# Patient Record
Sex: MALE | Race: WHITE | NOT HISPANIC OR LATINO | Employment: FULL TIME | URBAN - METROPOLITAN AREA
[De-identification: names, ages, dates, MRNs, and addresses within clinical notes are randomized per-mention and may not be internally consistent; named-entity substitution may affect disease eponyms.]

---

## 2019-10-21 ENCOUNTER — HISTORICAL (OUTPATIENT)
Dept: ADMINISTRATIVE | Facility: HOSPITAL | Age: 29
End: 2019-10-21

## 2019-10-21 LAB
ABS NEUT (OLG): 4.2 X10(3)/MCL (ref 2.1–9.2)
ALBUMIN SERPL-MCNC: 4.6 GM/DL (ref 3.4–5)
ALBUMIN/GLOB SERPL: 1.7 {RATIO} (ref 1.5–2.5)
ALP SERPL-CCNC: 44 UNIT/L (ref 38–126)
ALT SERPL-CCNC: 8 UNIT/L (ref 7–52)
AST SERPL-CCNC: 13 UNIT/L (ref 15–37)
BILIRUB SERPL-MCNC: 0.7 MG/DL (ref 0.2–1)
BILIRUBIN DIRECT+TOT PNL SERPL-MCNC: 0.1 MG/DL (ref 0–0.5)
BILIRUBIN DIRECT+TOT PNL SERPL-MCNC: 0.6 MG/DL
BUN SERPL-MCNC: 12 MG/DL (ref 7–18)
CALCIUM SERPL-MCNC: 9.8 MG/DL (ref 8.5–10)
CHLORIDE SERPL-SCNC: 102 MMOL/L (ref 98–107)
CHOLEST SERPL-MCNC: 211 MG/DL (ref 0–200)
CHOLEST/HDLC SERPL: 4.7 {RATIO}
CO2 SERPL-SCNC: 25 MMOL/L (ref 21–32)
CREAT SERPL-MCNC: 0.89 MG/DL (ref 0.6–1.3)
ERYTHROCYTE [DISTWIDTH] IN BLOOD BY AUTOMATED COUNT: 12.8 % (ref 11.5–17)
GLOBULIN SER-MCNC: 2.7 GM/DL (ref 1.2–3)
GLUCOSE SERPL-MCNC: 111 MG/DL (ref 74–106)
HCT VFR BLD AUTO: 43.7 % (ref 42–52)
HDLC SERPL-MCNC: 45 MG/DL (ref 35–60)
HGB BLD-MCNC: 15 GM/DL (ref 14–18)
LDLC SERPL CALC-MCNC: 146 MG/DL (ref 0–129)
LYMPHOCYTES # BLD AUTO: 2.8 X10(3)/MCL (ref 0.6–3.4)
LYMPHOCYTES NFR BLD AUTO: 36.4 % (ref 13–40)
MCH RBC QN AUTO: 29.7 PG (ref 27–31.2)
MCHC RBC AUTO-ENTMCNC: 34 GM/DL (ref 32–36)
MCV RBC AUTO: 86 FL (ref 80–94)
MONOCYTES # BLD AUTO: 0.6 X10(3)/MCL (ref 0.1–1.3)
MONOCYTES NFR BLD AUTO: 8.5 % (ref 0.1–24)
NEUTROPHILS NFR BLD AUTO: 55.1 % (ref 47–80)
PLATELET # BLD AUTO: 305 X10(3)/MCL (ref 130–400)
PMV BLD AUTO: 8.7 FL (ref 9.4–12.4)
POTASSIUM SERPL-SCNC: 4.1 MMOL/L (ref 3.5–5.1)
PROT SERPL-MCNC: 7.3 GM/DL (ref 6.4–8.2)
RBC # BLD AUTO: 5.05 X10(6)/MCL (ref 4.7–6.1)
SODIUM SERPL-SCNC: 138 MMOL/L (ref 136–145)
TESTOST SERPL-MCNC: 344 NG/DL (ref 300–1060)
TRIGL SERPL-MCNC: 98 MG/DL (ref 30–150)
TSH SERPL-ACNC: 1.32 MIU/ML (ref 0.35–4.94)
VLDLC SERPL CALC-MCNC: 19.6 MG/DL
WBC # SPEC AUTO: 7.6 X10(3)/MCL (ref 4.5–11.5)

## 2020-10-26 ENCOUNTER — HISTORICAL (OUTPATIENT)
Dept: ADMINISTRATIVE | Facility: HOSPITAL | Age: 30
End: 2020-10-26

## 2020-10-26 LAB
ABS NEUT (OLG): 5.9 X10(3)/MCL (ref 2.1–9.2)
ALBUMIN SERPL-MCNC: 4.2 GM/DL (ref 3.4–5)
ALBUMIN/GLOB SERPL: 1.75 {RATIO} (ref 1.5–2.5)
ALP SERPL-CCNC: 35 UNIT/L (ref 38–126)
ALT SERPL-CCNC: 22 UNIT/L (ref 7–52)
AST SERPL-CCNC: 12 UNIT/L (ref 15–37)
BILIRUB SERPL-MCNC: 0.5 MG/DL (ref 0.2–1)
BILIRUBIN DIRECT+TOT PNL SERPL-MCNC: 0.1 MG/DL (ref 0–0.5)
BILIRUBIN DIRECT+TOT PNL SERPL-MCNC: 0.4 MG/DL
BUN SERPL-MCNC: 20 MG/DL (ref 7–18)
CALCIUM SERPL-MCNC: 9.6 MG/DL (ref 8.5–10.1)
CHLORIDE SERPL-SCNC: 103 MMOL/L (ref 98–107)
CHOLEST SERPL-MCNC: 215 MG/DL (ref 0–200)
CHOLEST/HDLC SERPL: 3.4 {RATIO}
CO2 SERPL-SCNC: 26 MMOL/L (ref 21–32)
CREAT SERPL-MCNC: 0.84 MG/DL (ref 0.6–1.3)
ERYTHROCYTE [DISTWIDTH] IN BLOOD BY AUTOMATED COUNT: 13.2 % (ref 11.5–17)
GLOBULIN SER-MCNC: 2.4 GM/DL (ref 1.2–3)
GLUCOSE SERPL-MCNC: 103 MG/DL (ref 74–106)
HCT VFR BLD AUTO: 44 % (ref 42–52)
HDLC SERPL-MCNC: 64 MG/DL (ref 35–60)
HGB BLD-MCNC: 14.7 GM/DL (ref 14–18)
LDLC SERPL CALC-MCNC: 146 MG/DL (ref 0–129)
LYMPHOCYTES # BLD AUTO: 4.2 X10(3)/MCL (ref 0.6–3.4)
LYMPHOCYTES NFR BLD AUTO: 36.9 % (ref 13–40)
MCH RBC QN AUTO: 30.4 PG (ref 27–31.2)
MCHC RBC AUTO-ENTMCNC: 33 GM/DL (ref 32–36)
MCV RBC AUTO: 91 FL (ref 80–94)
MONOCYTES # BLD AUTO: 1.2 X10(3)/MCL (ref 0.1–1.3)
MONOCYTES NFR BLD AUTO: 10.6 % (ref 0.1–24)
NEUTROPHILS NFR BLD AUTO: 52.5 % (ref 47–80)
PLATELET # BLD AUTO: 390 X10(3)/MCL (ref 130–400)
PMV BLD AUTO: 8.8 FL (ref 9.4–12.4)
POTASSIUM SERPL-SCNC: 4.2 MMOL/L (ref 3.5–5.1)
PROT SERPL-MCNC: 6.6 GM/DL (ref 6.4–8.2)
RBC # BLD AUTO: 4.84 X10(6)/MCL (ref 4.7–6.1)
SODIUM SERPL-SCNC: 139 MMOL/L (ref 136–145)
TESTOST SERPL-MCNC: 329 NG/DL (ref 300–1060)
TRIGL SERPL-MCNC: 62 MG/DL (ref 30–150)
TSH SERPL-ACNC: 0.63 MIU/ML (ref 0.35–4.94)
VLDLC SERPL CALC-MCNC: 12.4 MG/DL
WBC # SPEC AUTO: 11.3 X10(3)/MCL (ref 4.5–11.5)

## 2021-08-25 ENCOUNTER — HISTORICAL (OUTPATIENT)
Dept: ADMINISTRATIVE | Facility: HOSPITAL | Age: 31
End: 2021-08-25

## 2021-08-25 LAB
ABS NEUT (OLG): 4.4 X10(3)/MCL (ref 2.1–9.2)
ALBUMIN SERPL-MCNC: 4.9 GM/DL (ref 3.4–5)
ALBUMIN/GLOB SERPL: 1.96 {RATIO} (ref 1.5–2.5)
ALP SERPL-CCNC: 50 UNIT/L (ref 38–126)
ALT SERPL-CCNC: 15 UNIT/L (ref 7–52)
AST SERPL-CCNC: 15 UNIT/L (ref 15–37)
BILIRUB SERPL-MCNC: 0.7 MG/DL (ref 0.2–1)
BILIRUBIN DIRECT+TOT PNL SERPL-MCNC: 0.2 MG/DL (ref 0–0.5)
BILIRUBIN DIRECT+TOT PNL SERPL-MCNC: 0.5 MG/DL
BUN SERPL-MCNC: 12 MG/DL (ref 7–18)
CALCIUM SERPL-MCNC: 10.3 MG/DL (ref 8.5–10.1)
CHLORIDE SERPL-SCNC: 101 MMOL/L (ref 98–107)
CHOLEST SERPL-MCNC: 204 MG/DL (ref 0–200)
CHOLEST/HDLC SERPL: 4.9 {RATIO}
CO2 SERPL-SCNC: 26 MMOL/L (ref 21–32)
CREAT SERPL-MCNC: 0.83 MG/DL (ref 0.6–1.3)
ERYTHROCYTE [DISTWIDTH] IN BLOOD BY AUTOMATED COUNT: 12.6 % (ref 11.5–17)
GLOBULIN SER-MCNC: 2.5 GM/DL (ref 1.2–3)
GLUCOSE SERPL-MCNC: 99 MG/DL (ref 74–106)
HCT VFR BLD AUTO: 46.5 % (ref 42–52)
HDLC SERPL-MCNC: 42 MG/DL (ref 35–60)
HGB BLD-MCNC: 15.2 GM/DL (ref 14–18)
LDLC SERPL CALC-MCNC: 128 MG/DL (ref 0–129)
LYMPHOCYTES # BLD AUTO: 2.8 X10(3)/MCL (ref 0.6–3.4)
LYMPHOCYTES NFR BLD AUTO: 35.8 % (ref 13–40)
MCH RBC QN AUTO: 28.8 PG (ref 27–31.2)
MCHC RBC AUTO-ENTMCNC: 33 GM/DL (ref 32–36)
MCV RBC AUTO: 88 FL (ref 80–94)
MONOCYTES # BLD AUTO: 0.6 X10(3)/MCL (ref 0.1–1.3)
MONOCYTES NFR BLD AUTO: 7.7 % (ref 0.1–24)
NEUTROPHILS NFR BLD AUTO: 56.5 % (ref 47–80)
PLATELET # BLD AUTO: 351 X10(3)/MCL (ref 130–400)
PMV BLD AUTO: 8.7 FL (ref 9.4–12.4)
POTASSIUM SERPL-SCNC: 4.2 MMOL/L (ref 3.5–5.1)
PROT SERPL-MCNC: 7.4 GM/DL (ref 6.4–8.2)
RBC # BLD AUTO: 5.27 X10(6)/MCL (ref 4.7–6.1)
SODIUM SERPL-SCNC: 138 MMOL/L (ref 136–145)
TRIGL SERPL-MCNC: 134 MG/DL (ref 30–150)
TSH SERPL-ACNC: 1.22 MIU/ML (ref 0.35–4.94)
VLDLC SERPL CALC-MCNC: 26.8 MG/DL
WBC # SPEC AUTO: 7.8 X10(3)/MCL (ref 4.5–11.5)

## 2021-08-26 LAB — EST CREAT CLEARANCE SER (OHS): 178 ML/MIN

## 2022-04-10 ENCOUNTER — HISTORICAL (OUTPATIENT)
Dept: ADMINISTRATIVE | Facility: HOSPITAL | Age: 32
End: 2022-04-10

## 2022-04-28 VITALS
OXYGEN SATURATION: 99 % | BODY MASS INDEX: 29.85 KG/M2 | WEIGHT: 213.19 LBS | HEIGHT: 71 IN | SYSTOLIC BLOOD PRESSURE: 123 MMHG | DIASTOLIC BLOOD PRESSURE: 87 MMHG

## 2022-05-03 NOTE — HISTORICAL [facility]
This is a historical note converted from Ava. Formatting and pictures may have been removed.  Please reference Ava for original formatting and attached multimedia. Chief Complaint  CPX FASTING  History of Present Illness  30?year old male presents for wellness visit.  Blood Pressure - 118/88  BMI - 32.59  Immunizations - Influenza vaccine and Tdap due  Diet - Average  Exercise - Intermittent yard work  Continues to struggle with fatigue,?mood irritability and lack of motivation.??Continues to gradually worsen. ?Affecting?daily activities?along with communications with kids and wife   10/21/19  Review of Systems  Constitutional:?No weight loss, no fever, fatigue, no chills, no night sweats,?no weakness  Eyes:?No blurred vision,?no redness,?no drainage,?no ocular?pain  HEENT:?No sore throat,?no ear pain, no sinus pressure, no nasal congestion, no rhinorrhea, no postnasal drip  Respiratory:?No cough, no wheezing, no sputum production, no shortness of breath  Cardiovascular:?No chest pain, no palpitations, no dyspnea on exertion,?no orthopnea  Gastrointestinal:?No nausea, no vomiting, no abdominal pain, no diarrhea,?no constipation, no melena,?no hematochezia  Genitourinary:?No dysuria, no hematuria, no frequency, no urgency, no incontinence, no discharge  Musculoskeletal:?No myalgias, no arthralgias, no weakness, no joint effusion, no edema  Integumentary:?No rashes, no hives, no itching, no lesions, no jaundice  Neurologic:?No headaches, no numbness, no tingling, no weakness, no dizziness  Psychiatric:?anxiety, irritability, no depression,?no suicidal ideations, no?homicidal ideations,?no delusions, no hallucinations  Endocrine:?No polyuria, no polydipsia, no polyphagia  Hematology:?No bruising, no lymphadenopathy,?no paleness  ?  Physical Exam  Vitals & Measurements  HR:?60(Peripheral)? BP:?118/88?  HT:?180.00?cm? WT:?105.600?kg? BMI:?32.59?  General:?Well developed, Well-nourished, in No acute distress,  A&O x 4  Eye:?PERRLA, EOMI, Clear conjunctiva, Eyelids unremarkable  Ears:?Bilateral EAC clear?and?Bilateral TM clear  Nose:? Mucosa normal, No rhinorrhea, No lesions  O/P:??No?erythema,?No tonsillar hypertrophy,?No tonsillar exudates,?No cobblestoning  Neck:?Soft, Nontender, No thyromegaly, Full range of motion, No cervical lymphadenopathy, No JVD  Cardiovascular:?Regular rate and rhythm, No murmurs, No gallops, No rubs  Respiratory:?Clear to auscultation bilaterally, No wheezes, No rhonchi, No?crackles  Abdomen:? Soft, Nontender, No hepatosplenomegaly, Normal and equal bowel sounds, No masses, No rebound, No guarding  Musculoskeletal:? No tenderness, FROM, No joint abnormality, No clubbing, No cyanosis,No?edema  Neurologic:? No motor or sensory deficits, Reflexes 2+ throughout, CN II-XII grossly intact  Integumentary:?No rashes or skin lesions  Psychiatric:?Good insight,?appropriate thought process, normal affect,?appropriate?speech,  non-suicidal, cooperative, appropriate judgment  Assessment/Plan  1.?Wellness examination?Z00.00  ?CBC, CMP, FLP, TSH today  Discussed the?importance of maintaining a balanced diet and regular exercise  Ordered:  Comprehensive Metabolic Panel, Routine collect, 10/26/20 10:24:00 CDT, Blood, Stop date 10/26/20 10:24:00 CDT, Lab Collect, Wellness examination, 10/26/20 10:24:00 CDT  Lipid Panel, Routine collect, 10/26/20 10:24:00 CDT, Blood, Stop date 10/26/20 10:24:00 CDT, Lab Collect, Wellness examination, 10/26/20 10:24:00 CDT  Preventative Health Care Est 18-39 years [id number] PC, Wellness examination, [facility], 10/26/20 10:06:00 CDT  ?  2.?Irritable mood?R45.4  ?Coping skills, stress management and treatment options discussed at length  Suspect due to underlying generalized anxiety disorder  Recommend starting?Zoloft 50 mg daily  Ordered:  Clinic Follow up, *Est. 11/23/20 10:45:00 CST, Order for future visit, Irritable mood, [facility]  ?  3.?Fatigue?R53.83  ?Coping skills, stress  management and treatment options discussed at length  Suspect due to underlying generalized anxiety disorder  Recommend starting?Zoloft 50 mg daily  Ordered:  Clinic Follow up, *Est. 11/23/20 10:45:00 CST, Order for future visit, Irritable mood, [facility]  ?  4.?Hypercholesterolemia?E78.00  ?FLP today  ?  5.?Encounter for immunization?Z23  ?Influenza vaccine today  Tdap IM today  ?  Orders:  sertraline, 50 mg = 1 tab(s), Oral, Daily, # 30 tab(s), 5 Refill(s), Pharmacy: [facility]/pharmacy #[id number], 180, cm, Height/Length Dosing, 10/26/20 9:38:00 CDT, 105.6, kg, Weight Dosing, 10/26/20 9:38:00 CDT  Referrals  Clinic Follow up, *Est. 11/23/20 10:45:00 CST, Order for future visit, Irritable mood, [facility]   Problem List/Past Medical History  Ongoing  Obesity  Historical  No qualifying data  Procedure/Surgical History  Vasectomy (06/05/2020)  Adenoidectomy (1995)  Tonsillectomy (1995)   Medications  Zoloft 50 mg oral tablet, 50 mg= 1 tab(s), Oral, Daily, 5 refills  Allergies  No Known Medication Allergies  Social History  Abuse/Neglect  No, 10/26/2020  Alcohol  Current, Beer, Daily, Alcohol use interferes with work or home: No. Others hurt by drinking: No. Household alcohol concerns: No., 10/26/2020  Employment/School  Employed, Work/School description: [employer] 10/26/2020  Substance Use  Never, 10/18/2020  Tobacco  4 or less cigarettes(less than 1/4 pack)/day in last 30 days, Cigarettes, No, 1 per day. 18 Years (Age started)., 10/26/2020  Family History  DVT - Deep vein thrombosis: Father.  Mother: History is negative  Brother: History is negative  Son: History is negative  Son: History is negative  Daughter: History is negative  Immunizations  Vaccine Date Status   tetanus/diphtheria/pertussis, acel(Tdap) 10/26/2020 Given   influenza virus vaccine, inactivated 10/26/2020 Given   influenza virus vaccine, inactivated 10/16/2019 Recorded   Health Maintenance  Health Maintenance  ???Pending?(in the next year)  ???  ??OverDue  ??? ? ? ?Smoking Cessation due??01/01/20??Variable frequency  ??? ? ? ?Alcohol Misuse Screening due??01/02/20??and every 1??year(s)  ??? ??Due?  ??? ? ? ?Influenza Vaccine due??10/01/20??and every 1??day(s)  ??? ? ? ?ADL Screening due??10/26/20??and every 1??year(s)  ??? ? ? ?Depression Screening due??10/26/20??Unknown Frequency  ??? ??Due In Future?  ??? ? ? ?Obesity Screening not due until??01/01/21??and every 1??year(s)  ???Satisfied?(in the past 1 year)  ??? ??Satisfied?  ??? ? ? ?Blood Pressure Screening on??10/26/20.??Satisfied by Jade Rowley LPN  ??? ? ? ?Body Mass Index Check on??10/26/20.??Satisfied by Jade Rowley LPN  ??? ? ? ?Influenza Vaccine on??10/26/20.??Satisfied by Jade Rowley LPN  ??? ? ? ?Obesity Screening on??10/26/20.??Satisfied by Jade Rowley LPN  ??? ? ? ?Tetanus Vaccine on??10/26/20.??Satisfied by Jade Rowley LPN  ?

## 2022-05-03 NOTE — HISTORICAL [facility]
This is a historical note converted from Ava. Formatting and pictures may have been removed.  Please reference Ava for original formatting and attached multimedia. Chief Complaint  CPX FASTING  History of Present Illness  31 year old male presents for wellness visit.  Blood Pressure - 130/80  BMI - 29.85  19 pound weight loss since last?office visit in October?with improved diet  Immunizations - Up to date  Diet - More Balanced  Exercise - Intermittent yard work  Continues to struggle with fatigue,?mood irritability and lack of motivation.??Continues to gradually worsen. ?Affecting?daily activities?along with communications with kids and wife  Hyperchol -  10/26/20.  Acute chest pain, nausea and dizziness 1 month ago.? Pt taken to [facility].? Workup unremarkable.? Pt did have followup Dr. Mcclain.? Denies any recurrence of symptoms.  Review of Systems  Constitutional:?No weight loss, no fever, no fatigue, no chills, no night sweats,?no weakness  Eyes:?No blurred vision,?no redness,?no drainage,?no ocular?pain  HEENT:?No sore throat,?no ear pain, no sinus pressure, no nasal congestion, no rhinorrhea, no postnasal drip  Respiratory:?No cough, no wheezing, no sputum production, no shortness of breath  Cardiovascular:?No chest pain, no palpitations, no dyspnea on exertion,?no orthopnea  Gastrointestinal:?No nausea, no vomiting, no abdominal pain, no diarrhea,?no constipation, no melena,?no hematochezia  Genitourinary:?No dysuria, no hematuria, no frequency, no urgency, no incontinence, no discharge  Musculoskeletal:?No myalgias, no arthralgias, no weakness, no joint effusion, no edema  Integumentary:?No rashes, no hives, no itching, no lesions, no jaundice  Neurologic:?No headaches, no numbness, no tingling, no weakness, no dizziness  Psychiatric:?No anxiety, no irritability, no depression,?no suicidal ideations, no?homicidal ideations,?no delusions, no hallucinations  Endocrine:?No polyuria, no  polydipsia, no polyphagia  Hematology:?No bruising, no lymphadenopathy,?no paleness  ?  Physical Exam  Vitals & Measurements  HR:?67(Peripheral)? BP:?130/80? SpO2:?98%?  HT:?180.00?cm? WT:?96.700?kg? BMI:?29.85?  General:?Well developed, Well-nourished, in No acute distress, A&O x 4  Eye:?PERRLA, EOMI, Clear conjunctiva, Eyelids unremarkable  Ears:?Bilateral EAC clear?and?Bilateral TM clear  Nose:? Mucosa normal, No rhinorrhea, No lesions  O/P:??No?erythema,?No tonsillar hypertrophy,?No tonsillar exudates,?No cobblestoning  Neck:?Soft, Nontender, No thyromegaly, Full range of motion, No cervical lymphadenopathy, No JVD  Cardiovascular:?Regular rate and rhythm, No murmurs, No gallops, No rubs  Respiratory:?Clear to auscultation bilaterally, No wheezes, No rhonchi, No?crackles  Abdomen:? Soft, Nontender, No hepatosplenomegaly, Normal and equal bowel sounds, No masses, No rebound, No guarding  Musculoskeletal:? No tenderness, FROM, No joint abnormality, No clubbing, No cyanosis,No?edema  Neurologic:? No motor or sensory deficits, Reflexes 2+ throughout, CN II-XII grossly intact  Integumentary:?No rashes or skin lesions  ?  Assessment/Plan  1.?Wellness examination?Z00.00  ?Discussed the?importance of maintaining a balanced diet and regular exercise  CBC, CMP, FLP, TSH today  Ordered:  Comprehensive Metabolic Panel, Routine collect, 08/25/21 15:26:00 CDT, Blood, Stop date 08/25/21 15:26:00 CDT, Lab Collect, Wellness examination, 08/25/21 15:26:00 CDT  Lipid Panel, Routine collect, 08/25/21 15:26:00 CDT, Blood, Stop date 08/25/21 15:26:00 CDT, Lab Collect, Wellness examination, 08/25/21 15:26:00 CDT  Preventative Health Care Est 18-39 years [id number] PC, Wellness examination, [facility], 08/25/21 15:20:00 CDT  Thyroid Stimulating Hormone, Routine collect, 08/25/21 15:26:00 CDT, Blood, Stop date 08/25/21 15:26:00 CDT, Lab Collect, Wellness examination, 08/25/21 15:26:00 CDT  ?  2.?Chest pain?R07.9  ?I suspect episode?was  due to acute?GERD?with possible esophageal spasm?triggering?vasovagal presyncope?and secondary anxiety  ?  Referrals  Clinic Follow up, Order for future visit   Problem List/Past Medical History  Ongoing  Obesity  Historical  No qualifying data  Procedure/Surgical History  Vasectomy (06/05/2020)  Adenoidectomy (1995)  Tonsillectomy (1995)   Medications  No active medications  Allergies  No Known Medication Allergies  Social History  Abuse/Neglect  No, 08/25/2021  Alcohol  Current, Beer, Daily, Alcohol use interferes with work or home: No. Others hurt by drinking: No. 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 18 Years (Age started)., 08/25/2021  Family History  DVT - Deep vein thrombosis: Father.  Mother: History is negative  Brother: History is negative  Son: History is negative  Son: History is negative  Daughter: History is negative  Immunizations  Vaccine Date Status   COVID-19 MRNA, LNP-S, PF- Pfizer 04/01/2021 Given   COVID-19 MRNA, LNP-S, PF- Pfizer 03/11/2021 Given   tetanus/diphtheria/pertussis, acel(Tdap) 10/26/2020 Given   influenza virus vaccine, inactivated 10/26/2020 Given   influenza virus vaccine, inactivated 10/16/2019 Recorded   Health Maintenance  Health Maintenance  ???Pending?(in the next year)  ??? ??OverDue  ??? ? ? ?Smoking Cessation due??01/01/21??Variable frequency  ??? ? ? ?Alcohol Misuse Screening due??01/02/21??and every 1??year(s)  ??? ??Due?  ??? ? ? ?ADL Screening due??08/25/21??and every 1??year(s)  ??? ? ? ?Depression Screening due??08/25/21??Unknown Frequency  ??? ??Due In Future?  ??? ? ? ?Obesity Screening not due until??01/01/22??and every 1??year(s)  ???Satisfied?(in the past 1 year)  ??? ??Satisfied?  ??? ? ? ?Blood Pressure Screening  on??08/25/21.??Satisfied by Jade Rowley LPN  ??? ? ? ?Body Mass Index Check on??08/25/21.??Satisfied by Jade Rowley LPN  ??? ? ? ?Diabetes Screening on??10/26/20.??Satisfied by Leopoldo Osborn  ??? ? ? ?Influenza Vaccine on??10/26/20.??Satisfied by Jade Rowley LPN  ??? ? ? ?Obesity Screening on??08/25/21.??Satisfied by Jade Rowley LPN  ??? ? ? ?Tetanus Vaccine on??10/26/20.??Satisfied by Jade Rowley LPN  ?

## 2022-05-03 NOTE — HISTORICAL [facility]ERN[facility]
This is a historical note converted from [employer]. Formatting and pictures may have been removed.  Please reference [employer] for original formatting and attached multimedia. Chief Complaint  cpx fasting  History of Present Illness  29?year old male presents for wellness visit.  Blood Pressure - 116/70  BMI - 32.13  Immunizations - up to date, Tetanus 2010  Diet - Average  Exercise - Intermittent yard work  Hx of chronic fatigue.?Pt has experienced intermittent mood irritability and lack of motivation.? Testosterone 277 8/11/17.  Review of Systems  Constitutional:?No weight loss, no fever, fatigue, no chills, no night sweats,?no weakness  Eyes:?No blurred vision,?no redness,?no drainage,?no ocular?pain  HEENT:?No sore throat,?no ear pain, no sinus pressure, no nasal congestion, no rhinorrhea, no postnasal drip  Respiratory:?No cough, no wheezing, no sputum production, no shortness of breath  Cardiovascular:?No chest pain, no palpitations, no dyspnea on exertion,?no orthopnea  Gastrointestinal:?No nausea, no vomiting, no abdominal pain, no diarrhea,?no constipation, no melena,?no hematochezia  Genitourinary:?No dysuria, no hematuria, no frequency, no urgency, no incontinence, no discharge  Musculoskeletal:?No myalgias, no arthralgias, no weakness, no joint effusion, no edema  Integumentary:?No rashes, no hives, no itching, no lesions, no jaundice  Neurologic:?No headaches, no numbness, no tingling, no weakness, no dizziness  Psychiatric:?No anxiety,?irritability, no depression,?no suicidal ideations, no?homicidal ideations,?no delusions, no hallucinations  Endocrine:?No polyuria, no polydipsia, no polyphagia  Hematology:?No bruising, no lymphadenopathy,?no paleness  ?  Physical Exam  Vitals & Measurements  HR:?60(Peripheral)? BP:?116/70?  HT:?180?cm? WT:?104.1?kg? BMI:?32.13?  General:?Well developed, Well-nourished, in No acute distress, A&O x 4  Eye:?PERRLA, EOMI, Clear conjunctiva, Eyelids unremarkable  Ears:?Bilateral  EAC clear?and?Bilateral TM clear  Nose:? Mucosa normal, No rhinorrhea, No lesions  O/P:??No?erythema,?No tonsillar hypertrophy,?No tonsillar exudates,?No cobblestoning  Neck:?Soft, Nontender, No thyromegaly, Full range of motion, No cervical lymphadenopathy, No JVD  Cardiovascular:?Regular rate and rhythm, No murmurs, No gallops, No rubs  Respiratory:?Clear to auscultation bilaterally, No wheezes, No rhonchi, No?crackles  Abdomen:? Soft, Nontender, No hepatosplenomegaly, Normal and equal bowel sounds, No masses, No rebound, No guarding  Musculoskeletal:? No tenderness, FROM, No joint abnormality, No clubbing, No cyanosis,No?edema  Neurologic:? No motor or sensory deficits, Reflexes 2+ throughout, CN II-XII grossly intact  Integumentary:?No rashes or skin lesions  ?  Assessment/Plan  1.?Wellness examination?Z00.00  ?Discussed the?importance of maintaining a balanced diet and regular exercise  Ordered:  CBC w/ Auto Diff, Routine collect, 10/21/19 14:56:00 CDT, Blood, Order for future visit, Stop date 10/21/19 14:56:00 CDT, Lab Collect, Wellness examination, 10/21/19 14:56:00 CDT  Comprehensive Metabolic Panel, Routine collect, 10/21/19 14:56:00 CDT, Blood, Order for future visit, Stop date 10/21/19 14:56:00 CDT, Lab Collect, Wellness examination, 10/21/19 14:56:00 CDT  Lipid Panel, Routine collect, 10/21/19 14:56:00 CDT, Blood, Order for future visit, Stop date 10/21/19 14:56:00 CDT, Lab Collect, Wellness examination, 10/21/19 14:56:00 CDT  Preventative Health Care Est 18-39 years [id number] PC, Wellness examination, [facility], 10/21/19 14:56:00 CDT  Thyroid Stimulating Hormone, Routine collect, 10/21/19 14:56:00 CDT, Blood, Order for future visit, Stop date 10/21/19 14:56:00 CDT, Lab Collect, Wellness examination, 10/21/19 14:56:00 CDT  ?  2.?Fatigue?R53.83  Ordered:  Testosterone Level Total, Routine collect, 10/21/19 14:56:00 CDT, Blood, Order for future visit, Stop date 10/21/19 14:56:00 CDT, Lab Collect,  Fatigue, 10/21/19 14:56:00 CDT  ?  Orders:  Lab Collection Request, 10/21/19 14:56:00 CDT, [facility], 10/21/19 14:56:00 CDT   Problem List/Past Medical History  Ongoing  Obesity  Historical  No qualifying data  Procedure/Surgical History  Adenoidectomy (1995)  Tonsillectomy (1995)   Medications  No active medications  Allergies  No Known Medication Allergies  Social History  Abuse/Neglect  No, 10/21/2019  Alcohol  Current, Beer, Daily, Alcohol use interferes with work or home: No. Others hurt by drinking: No. 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 18 Years (Age started)., 10/21/2019  Family History  DVT - Deep vein thrombosis: Father.  Mother: History is negative  Brother: History is negative  Son: History is negative  Son: History is negative  Daughter: History is negative  Immunizations  Vaccine Date Status   influenza virus vaccine, inactivated 10/16/2019 Recorded   Health Maintenance  Health Maintenance  ???Pending?(in the next year)  ??? ??OverDue  ??? ? ? ?Alcohol Misuse Screening due??01/01/19??and every 1??year(s)  ??? ??Due?  ??? ? ? ?ADL Screening due??10/21/19??and every 1??year(s)  ??? ? ? ?Tetanus Vaccine due??10/21/19??and every 10??year(s)  ??? ??Due In Future?  ??? ? ? ?Obesity Screening not due until??01/01/20??and every 1??year(s)  ???Satisfied?(in the past 1 year)  ??? ??Satisfied?  ??? ? ? ?Blood Pressure Screening on??10/21/19.??Satisfied by Jade Rowley LPN  ??? ? ? ?Body Mass Index Check on??10/21/19.??Satisfied by Jade Rowley LPN  ??? ? ? ?Influenza Vaccine on??10/16/19.??Satisfied by Gio Brown MD, Ben FELIZ  ??? ? ? ?Obesity Screening on??10/21/19.??Satisfied by Jade Rowley LPN  ?

## 2022-08-26 PROBLEM — E66.9 OBESITY: Status: ACTIVE | Noted: 2022-08-26

## 2024-03-26 PROBLEM — Z86.39 HX OF HYPERCHOLESTEROLEMIA: Status: ACTIVE | Noted: 2024-03-26

## 2024-04-03 PROCEDURE — 83002 ASSAY OF GONADOTROPIN (LH): CPT | Performed by: FAMILY MEDICINE

## 2024-04-03 PROCEDURE — 84402 ASSAY OF FREE TESTOSTERONE: CPT | Performed by: FAMILY MEDICINE

## 2024-04-03 PROCEDURE — 83001 ASSAY OF GONADOTROPIN (FSH): CPT | Performed by: FAMILY MEDICINE
